# Patient Record
Sex: MALE | Race: BLACK OR AFRICAN AMERICAN
[De-identification: names, ages, dates, MRNs, and addresses within clinical notes are randomized per-mention and may not be internally consistent; named-entity substitution may affect disease eponyms.]

---

## 2020-10-25 ENCOUNTER — HOSPITAL ENCOUNTER (EMERGENCY)
Dept: HOSPITAL 47 - EC | Age: 30
Discharge: HOME | End: 2020-10-25
Payer: COMMERCIAL

## 2020-10-25 VITALS
HEART RATE: 65 BPM | RESPIRATION RATE: 18 BRPM | TEMPERATURE: 98.3 F | SYSTOLIC BLOOD PRESSURE: 119 MMHG | DIASTOLIC BLOOD PRESSURE: 75 MMHG

## 2020-10-25 DIAGNOSIS — M79.644: Primary | ICD-10-CM

## 2020-10-25 DIAGNOSIS — F17.200: ICD-10-CM

## 2020-10-25 PROCEDURE — 99283 EMERGENCY DEPT VISIT LOW MDM: CPT

## 2020-10-25 NOTE — ED
General Adult HPI





- General


Chief complaint: Skin/Abscess/Foreign Body


Stated complaint: finger injury


Time Seen by Provider: 10/25/20 19:12


Source: patient, RN notes reviewed


Mode of arrival: ambulatory


Limitations: no limitations





- History of Present Illness


Initial comments: 





29-year-old male presents to the emergency room for a chief complaint of right 

finger pain.  Patient reports he has pain in the fingertip of his right second 

digit.  States this started yesterday while he was at work.  Patient states he 

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  Patient reports he

notices pain that has worsened today to the point where he did call into work.  

Patient denies any fever or chills.  Denies any pain with bending the finger.  

Denies any streaking or spreading redness.Patient has no other complaints at 

this time including shortness of breath, chest pain, abdominal pain, nausea or 

vomiting, headache, or visual changes.





- Related Data


                                  Previous Rx's











 Medication  Instructions  Recorded


 


traMADol HCl [Ultram] 50 mg PO Q6H PRN #12 tab 05/11/14


 


Cephalexin [Keflex] 500 mg PO Q6HR 10 Days #40 cap 10/25/20


 


Sulfamethox-Tmp 800-160Mg [Bactrim 1 tab PO Q12HR #20 tab 10/25/20





-160 mg]  











                                    Allergies











Allergy/AdvReac Type Severity Reaction Status Date / Time


 


No Known Allergies Allergy   Verified 10/25/20 19:09














Review of Systems


ROS Statement: 


Those systems with pertinent positive or pertinent negative responses have been 

documented in the HPI.





ROS Other: All systems not noted in ROS Statement are negative.





Past Medical History


Past Medical History: No Reported History


History of Any Multi-Drug Resistant Organisms: None Reported


Past Surgical History: No Surgical Hx Reported


Past Psychological History: No Psychological Hx Reported


Smoking Status: Current every day smoker


Past Alcohol Use History: None Reported


Past Drug Use History: None Reported





General Exam


Limitations: no limitations


General appearance: alert, in no apparent distress


Head exam: Present: atraumatic, normocephalic, normal inspection


Eye exam: Present: normal appearance, PERRL, EOMI.  Absent: scleral icterus, 

conjunctival injection, periorbital swelling


ENT exam: Present: normal exam, mucous membranes moist


Neck exam: Present: normal inspection.  Absent: tenderness, meningismus, 

lymphadenopathy


Respiratory exam: Present: normal lung sounds bilaterally.  Absent: respiratory 

distress, wheezes, rales, rhonchi, stridor


Cardiovascular Exam: Present: regular rate, normal rhythm, normal heart sounds. 

Absent: systolic murmur, diastolic murmur, rubs, gallop, clicks


Extremities exam: Present: full ROM (Full range of motion of the right second 

digit.), normal capillary refill (Capillary refill less than 2 seconds in the 

right second digit.  Radial pulse 2+ in the right upper extremity.), other 

(Patient has minimal edema to the palmar aspect of the right second digit distal

phalanx.  This is tender.  Non-erythematous.  No tenderness of the flexor 

tendon.  No streaking or spreading redness.)





Course


                                   Vital Signs











  10/25/20





  19:06


 


Temperature 98.3 F


 


Pulse Rate 65


 


Respiratory 18





Rate 


 


Blood Pressure 119/75


 


O2 Sat by Pulse 98





Oximetry 














Medical Decision Making





- Medical Decision Making





Vitals are stable.  Patient is afebrile.  HPI, physical exam as documented.  

Only minimal edema of the finger pad of the right second digit.  No erythema or 

streaking redness.  No increased warmth.  XR of the right finger is negative.  

Dr. Maradiaga also evaluated patient.  At this time he has a possibly developing

paronychia versus felon.  However there is no indication for drainage at this 

time as there is no fluctuance or evidence of pus.  We started patient on 

antibiotics.  I had a lengthy discussion with him regarding return parameters.  

If symptoms are worsening he needs to return for reevaluation and possible 

drainage.  He is agreeable to this.





Disposition


Clinical Impression: 


 Finger pain, right





Disposition: HOME SELF-CARE


Condition: Good


Instructions (If sedation given, give patient instructions):  Paronychia (ED)


Additional Instructions: 


Please take antibiotics as directed.  It may take about 24 hours for antibiotics

to have an effect.  However if your fingertip is starting to swell more, pain is

worsening, or pain is traveling up yourr finger you need to return to the 

emergency department for further evaluation.


Prescriptions: 


Sulfamethox-Tmp 800-160Mg [Bactrim -160 mg] 1 tab PO Q12HR #20 tab


Cephalexin [Keflex] 500 mg PO Q6HR 10 Days #40 cap


Is patient prescribed a controlled substance at d/c from ED?: No


Referrals: 


Una Munson MD [REFERRING] - 1-2 days


Alpesh Murry DO [Medical Doctor] - 1-2 days


Time of Disposition: 19:49

## 2020-10-25 NOTE — XR
EXAMINATION TYPE: XR finger RT

 

DATE OF EXAM: 10/25/2020

 

COMPARISON: NONE

 

HISTORY: Pain

 

TECHNIQUE: 3 views

 

FINDINGS: 3 views of the right index finger show no fracture nor dislocation. There is no sign of a r
adiopaque foreign body. Joint spaces are normal.

 

IMPRESSION: Negative right index finger exam.

## 2020-10-28 ENCOUNTER — HOSPITAL ENCOUNTER (EMERGENCY)
Dept: HOSPITAL 47 - EC | Age: 30
Discharge: HOME | End: 2020-10-28
Payer: COMMERCIAL

## 2020-10-28 VITALS
TEMPERATURE: 98.1 F | RESPIRATION RATE: 18 BRPM | SYSTOLIC BLOOD PRESSURE: 111 MMHG | HEART RATE: 69 BPM | DIASTOLIC BLOOD PRESSURE: 75 MMHG

## 2020-10-28 DIAGNOSIS — F17.200: ICD-10-CM

## 2020-10-28 DIAGNOSIS — L03.011: Primary | ICD-10-CM

## 2020-10-28 PROCEDURE — 10060 I&D ABSCESS SIMPLE/SINGLE: CPT

## 2020-10-28 PROCEDURE — 99283 EMERGENCY DEPT VISIT LOW MDM: CPT

## 2020-10-28 NOTE — ED
General Adult HPI





- General


Chief complaint: Recheck/Abnormal Lab/Rx


Stated complaint: Revisit right finger pain


Time Seen by Provider: 10/28/20 14:03


Source: patient, RN notes reviewed, old records reviewed


Mode of arrival: ambulatory


Limitations: no limitations





- History of Present Illness


Initial comments: 





29-year-old male presenting for reevaluation of right index finger pain and 

swelling.  Patient was initiated on antibiotics 3 days prior for suspected 

paronychia there is no drainable fluid collection at that time.  He's had 

worsening pain although he has not been able to get his antibiotics filled yet 

so he has not taken oral antibiotics over the last 3 days.  No fever or chills. 

No other complaints.





- Related Data


                                  Previous Rx's











 Medication  Instructions  Recorded


 


traMADol HCl [Ultram] 50 mg PO Q6H PRN #12 tab 05/11/14


 


Cephalexin [Keflex] 500 mg PO Q6HR 10 Days #40 cap 10/25/20


 


Sulfamethox-Tmp 800-160Mg [Bactrim 1 tab PO Q12HR #20 tab 10/25/20





-160 mg]  











                                    Allergies











Allergy/AdvReac Type Severity Reaction Status Date / Time


 


No Known Allergies Allergy   Verified 10/28/20 13:41














Review of Systems


ROS Statement: 


Those systems with pertinent positive or pertinent negative responses have been 

documented in the HPI.





ROS Other: All systems not noted in ROS Statement are negative.





Past Medical History


Past Medical History: No Reported History


History of Any Multi-Drug Resistant Organisms: None Reported


Past Surgical History: No Surgical Hx Reported


Past Psychological History: No Psychological Hx Reported


Smoking Status: Current every day smoker


Past Alcohol Use History: None Reported


Past Drug Use History: None Reported





General Exam


Limitations: no limitations


General appearance: alert, in no apparent distress


Head exam: Present: atraumatic, normocephalic


Eye exam: Present: normal appearance.  Absent: PERRL, EOMI


ENT exam: Present: normal exam


Neck exam: Present: normal inspection.  Absent: tenderness, meningismus


Respiratory exam: Present: normal lung sounds bilaterally.  Absent: respiratory 

distress, wheezes


Cardiovascular Exam: Present: regular rate, normal rhythm


Extremities exam: Present: other (right index finger paronychia)





Course





                                   Vital Signs











  10/28/20





  13:39


 


Temperature 98.1 F


 


Pulse Rate 69


 


Respiratory 18





Rate 


 


Blood Pressure 111/75


 


O2 Sat by Pulse 99





Oximetry 














Procedures





- Universal Protocol (Time Out)


Procedure Performed:: Insicion and drainage


Performing Provider: Asim Maradiaga


Nurse: Miracle Wills


Patient Identification (2 identifiers required): Chart, Verbal, Arm Band, Name, 

Birthdate


Patient/Legal Representative has Confirmed: Identity, Site, Procedure, Consent


Site: Right index finger


Site Marked: Yes


Site Verified With Patient/Guardian: Yes





- Incision & Drainage


Consent Obtained: written consent


Indication: right second digit paronychia


Site: hand


Anesthetic Used: lidocaine 1%


I&D Cleaning Method: Betadine


Scalpel Used: #11


Needle Aspiration Performed?: No


Irrigation Performed?: No


I&D Drainage Obtained: Pus


Patient Tolerated Procedure: well





- Nerve Block


Consent Obtained: written consent


Local Anesthetic Used: Lidocaine 1%


Amount of anesthesia used: 4


Nerve Blocks: digital


Procedure Successful: Yes


Complications: none


Patient Tolerated Procedure: well





Medical Decision Making





- Medical Decision Making





29-year-old male with right index finger paronychia, this is now drainable, 

incision and drainage is performed emergency department.  Pulse is obtained, 

patient will get his antibiotics filled which include Keflex and Bactrim.  He 

will follow with his primary care physician





Disposition


Clinical Impression: 


 Paronychia





Disposition: HOME SELF-CARE


Condition: Good


Instructions (If sedation given, give patient instructions):  Paronychia (ED)


Additional Instructions: 


Please take antibiotics as prescribed.


Is patient prescribed a controlled substance at d/c from ED?: No


Referrals: 


None,Stated [Primary Care Provider] - 1-2 days


Ish Dorsey [STAFF PHYSICIAN] - 1-2 days


Time of Disposition: 14:25